# Patient Record
Sex: MALE | Race: WHITE | NOT HISPANIC OR LATINO | Employment: FULL TIME | URBAN - METROPOLITAN AREA
[De-identification: names, ages, dates, MRNs, and addresses within clinical notes are randomized per-mention and may not be internally consistent; named-entity substitution may affect disease eponyms.]

---

## 2023-03-02 RX ORDER — ATENOLOL 50 MG/1
1 TABLET ORAL DAILY
COMMUNITY
Start: 2020-10-09 | End: 2023-09-21 | Stop reason: SDUPTHER

## 2023-03-02 RX ORDER — ATORVASTATIN CALCIUM 10 MG/1
1 TABLET, FILM COATED ORAL DAILY
COMMUNITY
Start: 2018-01-09 | End: 2023-09-21 | Stop reason: SDUPTHER

## 2023-03-02 RX ORDER — EPINEPHRINE 0.22MG
AEROSOL WITH ADAPTER (ML) INHALATION
COMMUNITY
Start: 2018-09-13

## 2023-03-02 RX ORDER — LEVOTHYROXINE SODIUM 137 UG/1
1 TABLET ORAL
COMMUNITY
Start: 2013-11-01 | End: 2023-07-25 | Stop reason: SDUPTHER

## 2023-03-02 RX ORDER — FENOFIBRATE 200 MG/1
1 CAPSULE ORAL
COMMUNITY
Start: 2013-08-09 | End: 2023-07-25 | Stop reason: SDUPTHER

## 2023-03-02 RX ORDER — SILDENAFIL 100 MG/1
1 TABLET, FILM COATED ORAL AS NEEDED
COMMUNITY
Start: 2018-09-13

## 2023-03-02 RX ORDER — VIT C/E/ZN/COPPR/LUTEIN/ZEAXAN 250MG-90MG
CAPSULE ORAL
COMMUNITY
Start: 2013-12-10

## 2023-03-02 RX ORDER — NAPROXEN SODIUM 220 MG/1
TABLET ORAL
COMMUNITY
Start: 2013-12-10

## 2023-03-02 RX ORDER — LOSARTAN POTASSIUM 100 MG/1
1 TABLET ORAL DAILY
COMMUNITY
Start: 2018-09-13 | End: 2023-09-21 | Stop reason: SDUPTHER

## 2023-04-07 PROBLEM — R68.89 SMALL EAR CANAL: Status: ACTIVE | Noted: 2023-04-07

## 2023-04-07 PROBLEM — I77.810 ECTATIC THORACIC AORTA (CMS-HCC): Status: ACTIVE | Noted: 2023-04-07

## 2023-04-07 PROBLEM — E03.9 HYPOTHYROIDISM: Status: ACTIVE | Noted: 2023-04-07

## 2023-04-07 PROBLEM — R73.9 HYPERGLYCEMIA: Status: ACTIVE | Noted: 2023-04-07

## 2023-04-07 PROBLEM — H91.90 HEARING LOSS: Status: ACTIVE | Noted: 2023-04-07

## 2023-04-07 PROBLEM — N52.9 ED (ERECTILE DYSFUNCTION): Status: ACTIVE | Noted: 2023-04-07

## 2023-04-07 PROBLEM — E78.5 HYPERLIPIDEMIA: Status: ACTIVE | Noted: 2023-04-07

## 2023-04-07 PROBLEM — I10 HYPERTENSION: Status: ACTIVE | Noted: 2023-04-07

## 2023-04-10 ENCOUNTER — OFFICE VISIT (OUTPATIENT)
Dept: PRIMARY CARE | Facility: CLINIC | Age: 63
End: 2023-04-10
Payer: COMMERCIAL

## 2023-04-10 VITALS — WEIGHT: 188 LBS | DIASTOLIC BLOOD PRESSURE: 80 MMHG | TEMPERATURE: 98.4 F | SYSTOLIC BLOOD PRESSURE: 162 MMHG

## 2023-04-10 DIAGNOSIS — R73.9 HYPERGLYCEMIA: ICD-10-CM

## 2023-04-10 DIAGNOSIS — R68.89 SMALL EAR CANAL: ICD-10-CM

## 2023-04-10 DIAGNOSIS — M75.02 ADHESIVE CAPSULITIS OF LEFT SHOULDER: ICD-10-CM

## 2023-04-10 DIAGNOSIS — Z00.00 HEALTH CARE MAINTENANCE: ICD-10-CM

## 2023-04-10 DIAGNOSIS — E78.5 HYPERLIPIDEMIA, UNSPECIFIED HYPERLIPIDEMIA TYPE: ICD-10-CM

## 2023-04-10 DIAGNOSIS — I10 HYPERTENSION, UNSPECIFIED TYPE: Primary | ICD-10-CM

## 2023-04-10 DIAGNOSIS — E03.9 HYPOTHYROIDISM, UNSPECIFIED TYPE: ICD-10-CM

## 2023-04-10 PROBLEM — H91.90 HEARING LOSS: Status: RESOLVED | Noted: 2023-04-07 | Resolved: 2023-04-10

## 2023-04-10 LAB
POC FINGERSTICK BLOOD GLUCOSE: 112 MG/DL (ref 70–100)
POC HDL CHOLESTEROL: 41 MG/DL (ref 0–40)
POC LDL CHOLESTEROL: 103 MG/DL (ref 0–100)
POC NON-HDL CHOLESTEROL: 133 MG/DL (ref 0–130)
POC TOTAL CHOLESTEROL/HDL RATIO: 4.3 (ref 0–4.5)
POC TOTAL CHOLESTEROL: 173 MG/DL (ref 0–199)
POC TRIGLYCERIDES: 147 MG/DL (ref 0–150)

## 2023-04-10 PROCEDURE — 99214 OFFICE O/P EST MOD 30 MIN: CPT | Performed by: FAMILY MEDICINE

## 2023-04-10 PROCEDURE — 3077F SYST BP >= 140 MM HG: CPT | Performed by: FAMILY MEDICINE

## 2023-04-10 PROCEDURE — 80061 LIPID PANEL: CPT | Performed by: FAMILY MEDICINE

## 2023-04-10 PROCEDURE — 1036F TOBACCO NON-USER: CPT | Performed by: FAMILY MEDICINE

## 2023-04-10 PROCEDURE — 82962 GLUCOSE BLOOD TEST: CPT | Performed by: FAMILY MEDICINE

## 2023-04-10 PROCEDURE — 3079F DIAST BP 80-89 MM HG: CPT | Performed by: FAMILY MEDICINE

## 2023-04-10 NOTE — PATIENT INSTRUCTIONS
Please check with your insurance to verify whether you should get your shingles vaccination here or at the pharmacy, and whether it is covered.  The purpose of this shot is to prevent the permanent unremitting pain of Post Herpetic Neuralgia which becomes more common in elderly patients that get Shingles.  This shot can be very expensive.    Do the corner exercises for your shoulder and return in two weeks to be reevaluated with your blood pressure monitor.

## 2023-04-10 NOTE — PROGRESS NOTES
Subjective   Patient ID: [id number]     Sanjay Dotson is a 63 y.o. male who presents for Med Management.    HPI  Complains of pain in his left triceps for several weeks;  has to throw heavy drums onto a truck; certain motions causes pain, especially when reaching backwards  Review of Systems  GEN-denies, fever, weakness or myalgias; no unexplained fever or chills  OPTH-No dry eyes, itchy eyes, or blurry vision   ENT-No hearing loss, tinnitus or vertigo  NECK-no stiffness, swelling or pain  PSYCH-No complaints regarding libido, appetite, memory or concentration;  no drug use or alcohol usage over six per week  SLEEP-No complaints of insomnia, apnea or restless legs  ALL/IMMUN-No history of sneezing or itching  HEM-No unexplained bruising or bleeding    Objective     /80 (BP Location: Right arm, Patient Position: Sitting)   Temp 36.9 °C (98.4 °F) (Skin)   Wt 85.3 kg (188 lb)      Physical Exam  Eyes-pupils equal round, reactive to light and accommodation, fundi with normal cup/disc ratio, conjunctiva without redness or discharge  General- well defined, well nourished, well hydrated individual in NAD  Skin- normal color and turgor; without nail pitting  Head-normocephalic without masses or tenderness  Ears-normal pinnae, and canals, with normal landmarks and light reflex of tympanic membranes bilaterally after irrigation of right ear canal  Nose-septum in the midline, normal mucosa bilaterally  Throat- without erythema or exudate, uvula in midlineNeck-supple without lymphadenopathy or thyromegaly; no carotid bruits  Heart- regular rate and rhythm, normal s1 and s2 without murmur or gallop  Lungs-clear to auscultation  Abdomen-soft, positive bowel sounds, without masses, HSmegaly or pain   Shoulder Exam- Full range of motion in right shoulder but left shoulder limited to 120 degrees of abduction;  Rotator cuff muscles examined individually and motor strength was 5/5 for the Suprispinatus, Infraspinatus, Teres  Minor and Subscapularis Muscles;  Impingement testing was negative    Assessment/Plan     Problem List Items Addressed This Visit          Circulatory    Hypertension - Primary       Musculoskeletal    Adhesive capsulitis of left shoulder       Endocrine/Metabolic    Hypothyroidism       Other    Hyperglycemia    Relevant Orders    POCT fingerstick glucose manually resulted    Hyperlipidemia    Relevant Orders    POCT fingerstick glucose manually resulted    POCT Lipid Panel manually resulted    Health care maintenance    Relevant Orders    Referral to Ophthalmology       Ernst Bell MD

## 2023-07-25 ENCOUNTER — TELEPHONE (OUTPATIENT)
Dept: PRIMARY CARE | Facility: CLINIC | Age: 63
End: 2023-07-25
Payer: COMMERCIAL

## 2023-07-25 DIAGNOSIS — E78.5 HYPERLIPIDEMIA, UNSPECIFIED HYPERLIPIDEMIA TYPE: ICD-10-CM

## 2023-07-25 DIAGNOSIS — E03.9 HYPOTHYROIDISM, UNSPECIFIED TYPE: ICD-10-CM

## 2023-07-25 RX ORDER — LEVOTHYROXINE SODIUM 137 UG/1
137 TABLET ORAL
Qty: 90 TABLET | Refills: 0 | Status: SHIPPED | OUTPATIENT
Start: 2023-07-25 | End: 2023-10-06 | Stop reason: SDUPTHER

## 2023-07-25 RX ORDER — FENOFIBRATE 200 MG/1
200 CAPSULE ORAL
Qty: 90 CAPSULE | Refills: 0 | Status: SHIPPED | OUTPATIENT
Start: 2023-07-25 | End: 2023-10-06 | Stop reason: SDUPTHER

## 2023-07-25 NOTE — TELEPHONE ENCOUNTER
Refill:    1)Fenofibrate 200mg daily    2)Levothyroxine 137mg daily    Pharm: [facility]     LR: 01/05/23 qty 90 w/ 1 refill  LV: 04/10/23   NV: 10/06/23

## 2023-09-21 ENCOUNTER — TELEPHONE (OUTPATIENT)
Dept: PRIMARY CARE | Facility: CLINIC | Age: 63
End: 2023-09-21
Payer: COMMERCIAL

## 2023-09-21 DIAGNOSIS — I10 HYPERTENSION, UNSPECIFIED TYPE: ICD-10-CM

## 2023-09-21 DIAGNOSIS — E78.5 HYPERLIPIDEMIA, UNSPECIFIED HYPERLIPIDEMIA TYPE: ICD-10-CM

## 2023-09-21 RX ORDER — ATORVASTATIN CALCIUM 10 MG/1
10 TABLET, FILM COATED ORAL DAILY
Qty: 90 TABLET | Refills: 0 | Status: SHIPPED | OUTPATIENT
Start: 2023-09-21 | End: 2023-11-22 | Stop reason: SDUPTHER

## 2023-09-21 RX ORDER — ATENOLOL 50 MG/1
50 TABLET ORAL DAILY
Qty: 90 TABLET | Refills: 0 | Status: SHIPPED | OUTPATIENT
Start: 2023-09-21 | End: 2023-11-22 | Stop reason: SDUPTHER

## 2023-09-21 RX ORDER — LOSARTAN POTASSIUM 100 MG/1
100 TABLET ORAL DAILY
Qty: 90 TABLET | Refills: 0 | Status: SHIPPED | OUTPATIENT
Start: 2023-09-21 | End: 2023-11-22 | Stop reason: SDUPTHER

## 2023-09-21 NOTE — TELEPHONE ENCOUNTER
Refill:    1)Losartan 100mg daily    2)Atorvastatin 10mg daily    3)Atenolol 50mg daily    Pharm: [facility]     LR: 02/28/23 qty 90 w/ 1 refill  LV: 04/10/23  NV;10/06/23

## 2023-10-06 ENCOUNTER — OFFICE VISIT (OUTPATIENT)
Dept: PRIMARY CARE | Facility: CLINIC | Age: 63
End: 2023-10-06
Payer: COMMERCIAL

## 2023-10-06 VITALS
DIASTOLIC BLOOD PRESSURE: 82 MMHG | TEMPERATURE: 98 F | WEIGHT: 191 LBS | SYSTOLIC BLOOD PRESSURE: 162 MMHG | BODY MASS INDEX: 26.65 KG/M2

## 2023-10-06 DIAGNOSIS — R73.9 HYPERGLYCEMIA: ICD-10-CM

## 2023-10-06 DIAGNOSIS — E03.9 HYPOTHYROIDISM, UNSPECIFIED TYPE: ICD-10-CM

## 2023-10-06 DIAGNOSIS — N52.9 ERECTILE DYSFUNCTION, UNSPECIFIED ERECTILE DYSFUNCTION TYPE: ICD-10-CM

## 2023-10-06 DIAGNOSIS — I10 HYPERTENSION, UNSPECIFIED TYPE: Primary | ICD-10-CM

## 2023-10-06 DIAGNOSIS — Z23 ENCOUNTER FOR IMMUNIZATION: ICD-10-CM

## 2023-10-06 DIAGNOSIS — E78.5 HYPERLIPIDEMIA, UNSPECIFIED HYPERLIPIDEMIA TYPE: ICD-10-CM

## 2023-10-06 PROBLEM — M75.02 ADHESIVE CAPSULITIS OF LEFT SHOULDER: Status: RESOLVED | Noted: 2023-04-10 | Resolved: 2023-10-06

## 2023-10-06 LAB
ALBUMIN SERPL BCP-MCNC: 4.8 G/DL (ref 3.4–5)
ALP SERPL-CCNC: 34 U/L (ref 33–136)
ALT SERPL W P-5'-P-CCNC: 21 U/L (ref 10–52)
ANION GAP SERPL CALC-SCNC: 13 MMOL/L (ref 10–20)
AST SERPL W P-5'-P-CCNC: 24 U/L (ref 9–39)
BILIRUB SERPL-MCNC: 0.8 MG/DL (ref 0–1.2)
BUN SERPL-MCNC: 18 MG/DL (ref 6–23)
CALCIUM SERPL-MCNC: 9.8 MG/DL (ref 8.6–10.3)
CHLORIDE SERPL-SCNC: 104 MMOL/L (ref 98–107)
CHOLEST SERPL-MCNC: 167 MG/DL (ref 0–199)
CHOLESTEROL/HDL RATIO: 3.9
CO2 SERPL-SCNC: 28 MMOL/L (ref 21–32)
CREAT SERPL-MCNC: 1.14 MG/DL (ref 0.5–1.3)
GFR SERPL CREATININE-BSD FRML MDRD: 73 ML/MIN/1.73M*2
GLUCOSE SERPL-MCNC: 99 MG/DL (ref 74–99)
HDLC SERPL-MCNC: 43.3 MG/DL
LDLC SERPL CALC-MCNC: 85 MG/DL (ref 140–190)
NON HDL CHOLESTEROL: 124 MG/DL (ref 0–149)
POTASSIUM SERPL-SCNC: 4.7 MMOL/L (ref 3.5–5.3)
PROT SERPL-MCNC: 8.1 G/DL (ref 6.4–8.2)
PSA SERPL-MCNC: 0.34 NG/ML
SODIUM SERPL-SCNC: 140 MMOL/L (ref 136–145)
TRIGL SERPL-MCNC: 195 MG/DL (ref 0–149)
TSH SERPL-ACNC: 1 MIU/L (ref 0.44–3.98)
VLDL: 39 MG/DL (ref 0–40)

## 2023-10-06 PROCEDURE — 90686 IIV4 VACC NO PRSV 0.5 ML IM: CPT | Performed by: FAMILY MEDICINE

## 2023-10-06 PROCEDURE — 1036F TOBACCO NON-USER: CPT | Performed by: FAMILY MEDICINE

## 2023-10-06 PROCEDURE — 80061 LIPID PANEL: CPT

## 2023-10-06 PROCEDURE — 84153 ASSAY OF PSA TOTAL: CPT

## 2023-10-06 PROCEDURE — 99396 PREV VISIT EST AGE 40-64: CPT | Performed by: FAMILY MEDICINE

## 2023-10-06 PROCEDURE — 84443 ASSAY THYROID STIM HORMONE: CPT

## 2023-10-06 PROCEDURE — 3079F DIAST BP 80-89 MM HG: CPT | Performed by: FAMILY MEDICINE

## 2023-10-06 PROCEDURE — 90471 IMMUNIZATION ADMIN: CPT | Performed by: FAMILY MEDICINE

## 2023-10-06 PROCEDURE — 36415 COLL VENOUS BLD VENIPUNCTURE: CPT

## 2023-10-06 PROCEDURE — 80053 COMPREHEN METABOLIC PANEL: CPT

## 2023-10-06 PROCEDURE — 3077F SYST BP >= 140 MM HG: CPT | Performed by: FAMILY MEDICINE

## 2023-10-06 RX ORDER — FENOFIBRATE 200 MG/1
200 CAPSULE ORAL
Qty: 90 CAPSULE | Refills: 1 | Status: SHIPPED | OUTPATIENT
Start: 2023-10-06 | End: 2024-02-22 | Stop reason: SDUPTHER

## 2023-10-06 RX ORDER — LEVOTHYROXINE SODIUM 137 UG/1
137 TABLET ORAL
Qty: 90 TABLET | Refills: 1 | Status: SHIPPED | OUTPATIENT
Start: 2023-10-06 | End: 2024-02-22 | Stop reason: SDUPTHER

## 2023-10-06 NOTE — PROGRESS NOTES
Subjective   Patient ID: [id number]     Sanjay Dotson is a 63 y.o. male who presents for Med Management.    HPI  Taking meds as directed without tolerability or affordability issues; no complaints today.    Review of Systems  CARDIO- No chest pain or pressure, nausea, diaphoresis, paresthesias, dizziness, or syncope with or without exertion  GI-No blood in stool, tarry stools, pain, vomiting, heartburn, constipation or diarrhea  PULM-No wheezing, coughing or shortness of breath  UROL-No frequency, urgency, blood in urine, or incontinence  ENDO- No change in hair, voice, skin, weight or temperature tolerance   MSK-No locking, giving way/swelling of joints;  shoulder is ok  NEURO- No daily headaches, hx of concussion, falls in the last year or seizure  DERM-No rashes, blanching or change in any moles    Objective     /82 (BP Location: Left arm, Patient Position: Sitting)   Temp 36.7 °C (98 °F) (Oral)   Wt 86.6 kg (191 lb)   BMI 26.65 kg/m²      Physical Exam  Neck-supple without lymphadenopathy or thyromegaly; no carotid bruits  Throat- without erythema or exudate, uvula in midlineNeck-supple without lymphadenopathy or thyromegaly; no carotid bruits  Heart- regular rate and rhythm, normal s1 and s2 without murmur or gallop  Lungs-clear to auscultation  Abdomen-soft, positive bowel sounds, without masses, HSmegaly or pain   Male Genitalia-circumcised penis and normal testes bilaterally without hernia   Rectal- normal ampulla and anus; hemetest negative; prostate smooth, normal size, and symmetrical      Assessment/Plan     Problem List Items Addressed This Visit       ED (erectile dysfunction)    Relevant Orders    Prostate Specific Antigen    Hyperglycemia    Hyperlipidemia    Relevant Medications    fenofibrate micronized (Lofibra) 200 mg capsule    Other Relevant Orders    Comprehensive Metabolic Panel    Lipid Panel    Hypertension - Primary    Hypothyroidism    Relevant Medications    levothyroxine  (Synthroid, Levoxyl) 137 mcg tablet    Other Relevant Orders    Thyroid Stimulating Hormone     Other Visit Diagnoses       Encounter for immunization        Relevant Orders    Flu vaccine (IIV4) age 6 months and greater, preservative free        You are eligible for the COVID booster.    Losing weight is very important for your overall health.  Some apps for your smart phone might help like [employer]tec, or using the Weight Watchers web site on your computer. An optimistic average weight loss is about one pound per week, which can be achieved with a 500 calorie deficit daily.    Exercising for 30' three times weekly, at an intensity that makes it difficult to say The Pledge [place] Allegiance on one breath, is a good basic exercise program    Follow up fasting (no alcohol for 48 hours and just water for 14 hours) in six months for your next routine appointment.  In general, take any medications on schedule (except for types of Insulin).      Ernst Bell MD

## 2023-10-06 NOTE — PATIENT INSTRUCTIONS
You are eligible for the COVID booster.    Losing weight is very important for your overall health.  Some apps for your smart phone might help like [employer], or using the Weight Watchers web site on your computer. An optimistic average weight loss is about one pound per week, which can be achieved with a 500 calorie deficit daily.    Exercising for 30' three times weekly, at an intensity that makes it difficult to say The Pledge of Allegiance on one breath, is a good basic exercise program    Follow up fasting (no alcohol for 48 hours and just water for 14 hours) in six months for your next routine appointment.  In general, take any medications on schedule (except for types of Insulin).

## 2023-11-21 ENCOUNTER — TELEPHONE (OUTPATIENT)
Dept: PRIMARY CARE | Facility: CLINIC | Age: 63
End: 2023-11-21
Payer: COMMERCIAL

## 2023-11-21 DIAGNOSIS — I10 HYPERTENSION, UNSPECIFIED TYPE: ICD-10-CM

## 2023-11-21 DIAGNOSIS — E78.5 HYPERLIPIDEMIA, UNSPECIFIED HYPERLIPIDEMIA TYPE: ICD-10-CM

## 2023-11-21 NOTE — TELEPHONE ENCOUNTER
Refill:    1)Atenolol 50mg daily    2)Astorvastatin 10mg daily    3)Losartan 100mg daily    Pharm: [facility]     LR: 09/21/23 qty 90 no refills  LV: 10/06/23  NV: Harley to sched his 6 month apt fro april

## 2023-11-22 RX ORDER — ATENOLOL 50 MG/1
50 TABLET ORAL DAILY
Qty: 90 TABLET | Refills: 1 | Status: SHIPPED | OUTPATIENT
Start: 2023-11-22 | End: 2024-05-21 | Stop reason: SDUPTHER

## 2023-11-22 RX ORDER — LOSARTAN POTASSIUM 100 MG/1
100 TABLET ORAL DAILY
Qty: 90 TABLET | Refills: 1 | Status: SHIPPED | OUTPATIENT
Start: 2023-11-22 | End: 2024-05-21 | Stop reason: SDUPTHER

## 2023-11-22 RX ORDER — ATORVASTATIN CALCIUM 10 MG/1
10 TABLET, FILM COATED ORAL DAILY
Qty: 90 TABLET | Refills: 1 | Status: SHIPPED | OUTPATIENT
Start: 2023-11-22 | End: 2024-05-21 | Stop reason: SDUPTHER

## 2024-02-22 ENCOUNTER — TELEPHONE (OUTPATIENT)
Dept: PRIMARY CARE | Facility: CLINIC | Age: 64
End: 2024-02-22

## 2024-02-22 DIAGNOSIS — E78.5 HYPERLIPIDEMIA, UNSPECIFIED HYPERLIPIDEMIA TYPE: ICD-10-CM

## 2024-02-22 DIAGNOSIS — E03.9 HYPOTHYROIDISM, UNSPECIFIED TYPE: ICD-10-CM

## 2024-02-22 RX ORDER — LEVOTHYROXINE SODIUM 137 UG/1
137 TABLET ORAL
Qty: 90 TABLET | Refills: 1 | Status: SHIPPED | OUTPATIENT
Start: 2024-02-22 | End: 2024-05-21 | Stop reason: SDUPTHER

## 2024-02-22 RX ORDER — FENOFIBRATE 200 MG/1
200 CAPSULE ORAL
Qty: 90 CAPSULE | Refills: 1 | Status: SHIPPED | OUTPATIENT
Start: 2024-02-22 | End: 2024-05-21 | Stop reason: SDUPTHER

## 2024-02-22 NOTE — TELEPHONE ENCOUNTER
Refill request     Med name-fenofibrate  Med dose-200mg  Med directions-take 1 capsule daily with a meal    Pharmacy-[facility]  Pharmacy address-[place]    Med name-levothyroxine  Med dose-137mcg  Med directions-take 1 tablet daily in the morning before a meal    LR-10/06/23(both scripts)  LV-10/06/23  Nv-04/03/24    I had told her(wife) that he would have enough medicine until his appt with Dr. Topete  ,but she insisted that the refills be done(said something about an insurance change)

## 2024-04-03 ENCOUNTER — APPOINTMENT (OUTPATIENT)
Dept: PRIMARY CARE | Facility: CLINIC | Age: 64
End: 2024-04-03
Payer: COMMERCIAL

## 2024-04-05 ENCOUNTER — APPOINTMENT (OUTPATIENT)
Dept: PRIMARY CARE | Facility: CLINIC | Age: 64
End: 2024-04-05
Payer: COMMERCIAL

## 2024-05-20 ENCOUNTER — APPOINTMENT (OUTPATIENT)
Dept: PRIMARY CARE | Facility: CLINIC | Age: 64
End: 2024-05-20
Payer: COMMERCIAL

## 2024-05-20 ENCOUNTER — TELEPHONE (OUTPATIENT)
Dept: PRIMARY CARE | Facility: CLINIC | Age: 64
End: 2024-05-20

## 2024-05-20 NOTE — TELEPHONE ENCOUNTER
Patient has new insurance and must go to the  lab in [place] to get his blood drawn. He has an appt with you tomorrow afternoon but wife is asking for orders to be put in so that he can stop into the lab tomorrow morning when he's fasting.

## 2024-05-21 ENCOUNTER — OFFICE VISIT (OUTPATIENT)
Dept: PRIMARY CARE | Facility: CLINIC | Age: 64
End: 2024-05-21
Payer: COMMERCIAL

## 2024-05-21 VITALS
TEMPERATURE: 97.8 F | DIASTOLIC BLOOD PRESSURE: 80 MMHG | BODY MASS INDEX: 27.49 KG/M2 | SYSTOLIC BLOOD PRESSURE: 154 MMHG | WEIGHT: 197 LBS

## 2024-05-21 DIAGNOSIS — I10 HYPERTENSION, UNSPECIFIED TYPE: ICD-10-CM

## 2024-05-21 DIAGNOSIS — E78.5 HYPERLIPIDEMIA, UNSPECIFIED HYPERLIPIDEMIA TYPE: ICD-10-CM

## 2024-05-21 DIAGNOSIS — E03.9 HYPOTHYROIDISM, UNSPECIFIED TYPE: ICD-10-CM

## 2024-05-21 PROCEDURE — 3079F DIAST BP 80-89 MM HG: CPT | Performed by: FAMILY MEDICINE

## 2024-05-21 PROCEDURE — 99214 OFFICE O/P EST MOD 30 MIN: CPT | Performed by: FAMILY MEDICINE

## 2024-05-21 PROCEDURE — 3077F SYST BP >= 140 MM HG: CPT | Performed by: FAMILY MEDICINE

## 2024-05-21 RX ORDER — FENOFIBRATE 200 MG/1
200 CAPSULE ORAL
Qty: 90 CAPSULE | Refills: 1 | Status: SHIPPED | OUTPATIENT
Start: 2024-05-21 | End: 2024-11-17

## 2024-05-21 RX ORDER — ATENOLOL 50 MG/1
50 TABLET ORAL DAILY
Qty: 90 TABLET | Refills: 1 | Status: SHIPPED | OUTPATIENT
Start: 2024-05-21 | End: 2024-11-17

## 2024-05-21 RX ORDER — LOSARTAN POTASSIUM 100 MG/1
100 TABLET ORAL DAILY
Qty: 90 TABLET | Refills: 1 | Status: SHIPPED | OUTPATIENT
Start: 2024-05-21 | End: 2024-11-17

## 2024-05-21 RX ORDER — ATORVASTATIN CALCIUM 10 MG/1
10 TABLET, FILM COATED ORAL DAILY
Qty: 90 TABLET | Refills: 1 | Status: SHIPPED | OUTPATIENT
Start: 2024-05-21 | End: 2024-11-17

## 2024-05-21 RX ORDER — LEVOTHYROXINE SODIUM 137 UG/1
137 TABLET ORAL
Qty: 90 TABLET | Refills: 1 | Status: SHIPPED | OUTPATIENT
Start: 2024-05-21 | End: 2024-11-17

## 2024-05-21 ASSESSMENT — ENCOUNTER SYMPTOMS
WHEEZING: 0
VOMITING: 0
ADENOPATHY: 0
SLEEP DISTURBANCE: 0
FREQUENCY: 0
DYSURIA: 0
HEMATURIA: 0
BRUISES/BLEEDS EASILY: 0
SHORTNESS OF BREATH: 0
PALPITATIONS: 0
CONSTIPATION: 0
ABDOMINAL PAIN: 0
COUGH: 0
DYSPHORIC MOOD: 0
DIARRHEA: 0
NAUSEA: 0

## 2024-05-21 NOTE — PROGRESS NOTES
Subjective   Patient ID: Sanjay Dotson is a 64 y.o. male who presents for 6 month follow up.    HPI     He states he has started exercising again. He states his Bp has improved slightly since starting that. His BP is always 20-30 points higher     Review of Systems   Respiratory:  Negative for cough, shortness of breath and wheezing.    Cardiovascular:  Negative for chest pain and palpitations.   Gastrointestinal:  Negative for abdominal pain, constipation, diarrhea, nausea and vomiting.   Genitourinary:  Negative for dysuria, frequency, hematuria and urgency.   Hematological:  Negative for adenopathy. Does not bruise/bleed easily.   Psychiatric/Behavioral:  Negative for dysphoric mood and sleep disturbance.        Objective   /80 (BP Location: Right arm, Patient Position: Sitting)   Temp 36.6 °C (97.8 °F) (Oral)   Wt 89.4 kg (197 lb)   BMI 27.49 kg/m²     Physical Exam  Constitutional:       Appearance: Normal appearance.   HENT:      Head: Normocephalic.   Cardiovascular:      Rate and Rhythm: Normal rate and regular rhythm.      Pulses: Normal pulses.      Heart sounds: Normal heart sounds.   Pulmonary:      Effort: Pulmonary effort is normal.      Breath sounds: Normal breath sounds.   Skin:     General: Skin is warm and dry.   Neurological:      Mental Status: He is alert.         Assessment/Plan   Problem List Items Addressed This Visit             ICD-10-CM    Hyperlipidemia E78.5    Relevant Medications    fenofibrate micronized (Lofibra) 200 mg capsule    atorvastatin (Lipitor) 10 mg tablet    Hypertension I10    Relevant Medications    atenolol (Tenormin) 50 mg tablet    losartan (Cozaar) 100 mg tablet    Hypothyroidism E03.9    Relevant Medications    levothyroxine (Synthroid, Levoxyl) 137 mcg tablet     Medications refilled as above, he states his BP is lower at home by 20-30 points and he is working improving his exercise. Work on 5 lb weight loss and follow up with PCP in 6 mo, sooner as  needed

## 2024-11-18 ENCOUNTER — TELEPHONE (OUTPATIENT)
Dept: PRIMARY CARE | Facility: CLINIC | Age: 64
End: 2024-11-18
Payer: COMMERCIAL

## 2024-11-18 DIAGNOSIS — I10 HYPERTENSION, UNSPECIFIED TYPE: ICD-10-CM

## 2024-11-18 DIAGNOSIS — E78.5 HYPERLIPIDEMIA, UNSPECIFIED HYPERLIPIDEMIA TYPE: ICD-10-CM

## 2024-11-18 DIAGNOSIS — E03.9 HYPOTHYROIDISM, UNSPECIFIED TYPE: ICD-10-CM

## 2024-11-18 NOTE — TELEPHONE ENCOUNTER
Rx Refill Request Telephone Encounter    Name:  Sanjay Dotson  :  [id number]  Medication Name:  losartan (Cozaar) 100 mg tablet [[id number]]   Last refill: 24  Dispense Quantity: 90 tablet Refills: 1    Rx Refill Request Telephone Encounter    Name:  Sanjay Dotson  :  [id number]  Medication Name:    atorvastatin (Lipitor) 10 mg tablet [[id number]]   Last refill: 24  Dispense Quantity: 90 tablet Refills: 1    Rx Refill Request Telephone Encounter    Name:  Sanjay Dotson  :  [id number]  Medication Name:  atenolol (Tenormin) 50 mg tablet   Last refill: 24  Dispense Quantity: 90 tablet Refills: 1    Rx Refill Request Telephone Encounter    Name:  Sanjay Dotson  :  [id number]  Medication Name:  levothyroxine (Synthroid, Levoxyl) 137 mcg tablet   Last refill: 24  Dispense Quantity: 90 tablet Refills: 1    Rx Refill Request Telephone Encounter    Name:  Sanjay Dotson  :  [id number]  Medication Name:  fenofibrate micronized (Lofibra) 200 mg capsule   Last refill: 24    Dispense Quantity: 90 capsule Refills: 1   Specific Pharmacy location:  [facility] [address] [address]   Date of last appointment:  24  Date of next appointment:  24  Best number to reach patient:  [phone]

## 2024-11-19 RX ORDER — ATENOLOL 50 MG/1
50 TABLET ORAL DAILY
Qty: 90 TABLET | Refills: 1 | Status: SHIPPED | OUTPATIENT
Start: 2024-11-19 | End: 2025-05-18

## 2024-11-19 RX ORDER — LEVOTHYROXINE SODIUM 137 UG/1
137 TABLET ORAL
Qty: 90 TABLET | Refills: 1 | Status: SHIPPED | OUTPATIENT
Start: 2024-11-19 | End: 2025-05-18

## 2024-11-19 RX ORDER — ATORVASTATIN CALCIUM 10 MG/1
10 TABLET, FILM COATED ORAL DAILY
Qty: 90 TABLET | Refills: 1 | Status: SHIPPED | OUTPATIENT
Start: 2024-11-19 | End: 2025-05-18

## 2024-11-19 RX ORDER — LOSARTAN POTASSIUM 100 MG/1
100 TABLET ORAL DAILY
Qty: 90 TABLET | Refills: 1 | Status: SHIPPED | OUTPATIENT
Start: 2024-11-19 | End: 2025-05-18

## 2024-11-19 RX ORDER — FENOFIBRATE 200 MG/1
200 CAPSULE ORAL
Qty: 90 CAPSULE | Refills: 1 | Status: SHIPPED | OUTPATIENT
Start: 2024-11-19 | End: 2025-05-18

## 2024-12-04 NOTE — PROGRESS NOTES
Subjective   Patient ID: [id number]     Sanjay Dotson is a 64 y.o. male who presents for Med Management.    HPI  Taking meds as directed without tolerability or affordability issues; no complaints today.  Home blood pressures are 120's over 70's    Review of Systems  CARDIO- No chest pain or pressure, nausea, diaphoresis, paresthesias, dizziness, or syncope with or without exertion  GI-No blood in stool, tarry stools, pain, vomiting, heartburn, constipation or diarrhea  PULM-No wheezing, coughing or shortness of breath  UROL-No frequency, urgency, blood in urine, or incontinence; nocturia not present  ENDO- No change in hair, voice, skin, weight or temperature tolerance   MSK-No locking, giving way/swelling of joints  NEURO- No daily headaches, hx of concussion, fall or seizure in the last year   DERM-No rashes, blanching or change in any moles    Objective     /89 (BP Location: Left arm, Patient Position: Sitting, BP Cuff Size: Adult)   Temp 36.7 °C (98.1 °F) (Oral)   Wt 87 kg (191 lb 12.8 oz)   BMI 26.76 kg/m²      Physical Exam  Neck-supple without lymphadenopathy or thyromegaly; no carotid bruits  Heart- regular rate and rhythm, normal s1 and s2 without murmur or gallop; no peripheral edema  Lungs-clear to auscultation  Abdomen-soft, positive bowel sounds, without masses, HSmegaly or pain   Rectal- normal ampulla and anus; hemetest negative; prostate smooth, normal size, and symmetrical    Assessment/Plan     Problem List Items Addressed This Visit       ED (erectile dysfunction)    Hyperglycemia    Hyperlipidemia - Primary    Relevant Orders    Lipid Panel    Comprehensive Metabolic Panel    Thyroid Stimulating Hormone    Hypertension    Hypothyroidism     Please check with your insurance to verify whether you should get your shingles vaccination here or at the pharmacy, and whether it is covered.  The purpose of this shot is to prevent the permanent unremitting pain of Post Herpetic Neuralgia which  becomes more common in elderly patients that get Shingles.  This shot can be very expensive.    You are eligible for the COVID booster.  Without a recent (within 90 days) challenge (booster or infection) your immune system will be three days behind in protecting you from the infection.  You will infect approximately five people by that time.    Follow up fasting (no alcohol for 48 hours and just water for 14 hours) in six months for your next routine appointment.  In general, take any medications on schedule (except for types of Insulin).      Ernst Bell MD

## 2024-12-04 NOTE — PATIENT INSTRUCTIONS
Please check with your insurance to verify whether you should get your shingles vaccination here or at the pharmacy, and whether it is covered.  The purpose of this shot is to prevent the permanent unremitting pain of Post Herpetic Neuralgia which becomes more common in elderly patients that get Shingles.  This shot can be very expensive.    You are eligible for the COVID booster.  Without a recent (within 90 days) challenge (booster or infection) your immune system will be three days behind in protecting you from the infection.  You will infect approximately five people by that time.    Follow up fasting (no alcohol for 48 hours and just water for 14 hours) in six months for your next routine appointment.  In general, take any medications on schedule (except for types of Insulin).      Ernst Bell MD

## 2024-12-05 ENCOUNTER — APPOINTMENT (OUTPATIENT)
Dept: PRIMARY CARE | Facility: CLINIC | Age: 64
End: 2024-12-05
Payer: COMMERCIAL

## 2024-12-05 VITALS
SYSTOLIC BLOOD PRESSURE: 167 MMHG | DIASTOLIC BLOOD PRESSURE: 89 MMHG | TEMPERATURE: 98.1 F | BODY MASS INDEX: 26.76 KG/M2 | WEIGHT: 191.8 LBS

## 2024-12-05 DIAGNOSIS — I10 HYPERTENSION, UNSPECIFIED TYPE: ICD-10-CM

## 2024-12-05 DIAGNOSIS — R73.9 HYPERGLYCEMIA: ICD-10-CM

## 2024-12-05 DIAGNOSIS — E78.5 HYPERLIPIDEMIA, UNSPECIFIED HYPERLIPIDEMIA TYPE: Primary | ICD-10-CM

## 2024-12-05 DIAGNOSIS — E03.9 HYPOTHYROIDISM, UNSPECIFIED TYPE: ICD-10-CM

## 2024-12-05 DIAGNOSIS — N52.9 ERECTILE DYSFUNCTION, UNSPECIFIED ERECTILE DYSFUNCTION TYPE: ICD-10-CM

## 2024-12-05 PROCEDURE — 3079F DIAST BP 80-89 MM HG: CPT | Performed by: FAMILY MEDICINE

## 2024-12-05 PROCEDURE — 90656 IIV3 VACC NO PRSV 0.5 ML IM: CPT | Performed by: FAMILY MEDICINE

## 2024-12-05 PROCEDURE — 90471 IMMUNIZATION ADMIN: CPT | Performed by: FAMILY MEDICINE

## 2024-12-05 PROCEDURE — 3077F SYST BP >= 140 MM HG: CPT | Performed by: FAMILY MEDICINE

## 2024-12-05 PROCEDURE — 99214 OFFICE O/P EST MOD 30 MIN: CPT | Performed by: FAMILY MEDICINE

## 2024-12-11 PROBLEM — N18.31 STAGE 3A CHRONIC KIDNEY DISEASE (MULTI): Status: ACTIVE | Noted: 2024-12-11

## 2025-06-18 ENCOUNTER — TELEPHONE (OUTPATIENT)
Dept: PRIMARY CARE | Facility: CLINIC | Age: 65
End: 2025-06-18
Payer: COMMERCIAL

## 2025-06-18 DIAGNOSIS — E03.9 HYPOTHYROIDISM, UNSPECIFIED TYPE: ICD-10-CM

## 2025-06-18 DIAGNOSIS — I10 HYPERTENSION, UNSPECIFIED TYPE: ICD-10-CM

## 2025-06-18 DIAGNOSIS — E78.5 HYPERLIPIDEMIA, UNSPECIFIED HYPERLIPIDEMIA TYPE: ICD-10-CM

## 2025-06-18 NOTE — TELEPHONE ENCOUNTER
Refill request     Med name-losartan  Med dose-100mg  Med directions-take 1 tablet daily    Med name-atorvastatin  Med dose-10mg  Med directions-take 1 tablet daily    Med name-atenolol  Med dose-50mg  Med directions-take 1 tablet daily    Med name-levothyroxine  Med dose-137mcg  Med directions-take 1 tablet daily in the morning before a meal    Med name-fenofibrate micronized  Med dose-200mg  Med directions-take 1 capsule daily with breakfast    [facility]    LR-11/19/24  LV-12/05/24  NV-07/01/25    Thanks

## 2025-06-19 RX ORDER — FENOFIBRATE 200 MG/1
200 CAPSULE ORAL
Qty: 90 CAPSULE | Refills: 1 | Status: SHIPPED | OUTPATIENT
Start: 2025-06-19 | End: 2025-12-16

## 2025-06-19 RX ORDER — ATORVASTATIN CALCIUM 10 MG/1
10 TABLET, FILM COATED ORAL DAILY
Qty: 90 TABLET | Refills: 1 | Status: SHIPPED | OUTPATIENT
Start: 2025-06-19 | End: 2025-12-16

## 2025-06-19 RX ORDER — ATENOLOL 50 MG/1
50 TABLET ORAL DAILY
Qty: 90 TABLET | Refills: 1 | Status: SHIPPED | OUTPATIENT
Start: 2025-06-19 | End: 2025-12-16

## 2025-06-19 RX ORDER — LEVOTHYROXINE SODIUM 137 UG/1
137 TABLET ORAL
Qty: 90 TABLET | Refills: 1 | Status: SHIPPED | OUTPATIENT
Start: 2025-06-19 | End: 2025-12-16

## 2025-06-19 RX ORDER — LOSARTAN POTASSIUM 100 MG/1
100 TABLET ORAL DAILY
Qty: 90 TABLET | Refills: 1 | Status: SHIPPED | OUTPATIENT
Start: 2025-06-19 | End: 2025-12-16

## 2025-06-24 ENCOUNTER — TELEPHONE (OUTPATIENT)
Dept: PRIMARY CARE | Facility: CLINIC | Age: 65
End: 2025-06-24
Payer: COMMERCIAL

## 2025-06-24 NOTE — TELEPHONE ENCOUNTER
Patient has an appt with you on 7/1/25 for med management. His insurance doesn't cover labs done/drawn in our office so they would like an order to get labs done prior to appt. Lipid panel, CMP and TSH done December 2024

## 2025-06-25 DIAGNOSIS — E78.5 HYPERLIPIDEMIA, UNSPECIFIED HYPERLIPIDEMIA TYPE: ICD-10-CM

## 2025-06-25 DIAGNOSIS — R73.9 HYPERGLYCEMIA: Primary | ICD-10-CM

## 2025-06-30 NOTE — PROGRESS NOTES
Subjective   Patient ID: [id number]     Sanjay Dotson is a 65 y.o. male who presents for No chief complaint on file..    HPI  Taking meds as directed without tolerability or affordability issues; no complaints today.  Taking otc vitamins  Patient defers on pneumonia vaccine    Review of Systems  GEN-denies weakness or myalgias; no unexplained fever or chills  OPTH-No dry eyes, itchy eyes, or blurry vision   ENT-No hearing loss, tinnitus or vertigo  NECK-no stiffness, swelling or pain  PSYCH-No complaints regarding libido, appetite, memory or concentration;  no drug use or alcohol usage over six per week  SLEEP-No complaints of insomnia, apnea or restless legs;  patient is waking up rested  ALL/IMMUN-No history of sneezing or itching  HEM-No unexplained bruising or bleeding    Objective     /88 (BP Location: Left arm, Patient Position: Sitting, BP Cuff Size: Large adult)   Temp 36.5 °C (97.7 °F) (Oral)   Wt 88.3 kg (194 lb 10.7 oz)   BMI 27.16 kg/m²      Physical Exam  General- well defined, well nourished, well hydrated individual in NAD  Skin- normal color and turgor; without nail pitting  Head-normocephalic without masses or tenderness  Eyes-pupils equal round, reactive to light and accommodation, fundi with normal cup/disc ratio, conjunctiva without redness or discharge  Ears-normal pinnae, and canals, with normal landmarks and light reflex of tympanic membranes bilaterally  Nose-septum in the midline, normal mucosa bilaterally  Throat- without erythema or exudate, uvula in midline  Neck-supple without lymphadenopathy or thyromegaly; no carotid bruits  Heart- regular rate and rhythm, normal s1 and s2 without murmur or gallop; peripheral pulses 2+ and symmetrical  Lungs-clear to auscultation  Abdomen-soft, positive bowel sounds, without masses, HSmegaly or pain     Assessment/Plan     Problem List Items Addressed This Visit       Hyperglycemia    Hyperlipidemia - Primary    2018:  Cardiac calcium score  of ZERO         Hypertension    Small ear canal    Right sided          Please check with your insurance to verify whether you should get your shingles vaccination here or at the pharmacy, and whether it is covered.  The purpose of this shot is to prevent the permanent unremitting pain of Post Herpetic Neuralgia which becomes more common in elderly patients that get Shingles.  This shot can be very expensive.    Follow up fasting (no alcohol for 48 hours and just water for 14 hours) in six months for your next routine appointment.  In general, take any medications on schedule (except for types of Insulin).      Ernst Bell MD

## 2025-06-30 NOTE — PATIENT INSTRUCTIONS
Bring all your supplements to your next appointment please.    Please check with your insurance to verify whether you should get your shingles vaccination here or at the pharmacy, and whether it is covered.  The purpose of this shot is to prevent the permanent unremitting pain of Post Herpetic Neuralgia which becomes more common in elderly patients that get Shingles.  This shot can be very expensive.    Follow up fasting (no alcohol for 48 hours and just water for 14 hours) in six months for your next routine appointment.  In general, take any medications on schedule (except for types of Insulin).

## 2025-07-01 ENCOUNTER — APPOINTMENT (OUTPATIENT)
Dept: PRIMARY CARE | Facility: CLINIC | Age: 65
End: 2025-07-01
Payer: COMMERCIAL

## 2025-07-01 VITALS
SYSTOLIC BLOOD PRESSURE: 136 MMHG | BODY MASS INDEX: 27.16 KG/M2 | TEMPERATURE: 97.7 F | WEIGHT: 194.67 LBS | DIASTOLIC BLOOD PRESSURE: 88 MMHG

## 2025-07-01 DIAGNOSIS — E78.5 HYPERLIPIDEMIA, UNSPECIFIED HYPERLIPIDEMIA TYPE: Primary | ICD-10-CM

## 2025-07-01 DIAGNOSIS — I10 HYPERTENSION, UNSPECIFIED TYPE: ICD-10-CM

## 2025-07-01 DIAGNOSIS — R73.9 HYPERGLYCEMIA: ICD-10-CM

## 2025-07-01 DIAGNOSIS — R68.89 SMALL EAR CANAL: ICD-10-CM

## 2025-07-01 PROCEDURE — 3079F DIAST BP 80-89 MM HG: CPT | Performed by: FAMILY MEDICINE

## 2025-07-01 PROCEDURE — 1036F TOBACCO NON-USER: CPT | Performed by: FAMILY MEDICINE

## 2025-07-01 PROCEDURE — 99214 OFFICE O/P EST MOD 30 MIN: CPT | Performed by: FAMILY MEDICINE

## 2025-07-01 PROCEDURE — 3075F SYST BP GE 130 - 139MM HG: CPT | Performed by: FAMILY MEDICINE

## 2025-07-01 ASSESSMENT — PATIENT HEALTH QUESTIONNAIRE - PHQ9
SUM OF ALL RESPONSES TO PHQ9 QUESTIONS 1 AND 2: 0
1. LITTLE INTEREST OR PLEASURE IN DOING THINGS: NOT AT ALL
2. FEELING DOWN, DEPRESSED OR HOPELESS: NOT AT ALL